# Patient Record
Sex: MALE | Race: WHITE | NOT HISPANIC OR LATINO | Employment: OTHER | URBAN - METROPOLITAN AREA
[De-identification: names, ages, dates, MRNs, and addresses within clinical notes are randomized per-mention and may not be internally consistent; named-entity substitution may affect disease eponyms.]

---

## 2018-01-16 ENCOUNTER — PES CALL (OUTPATIENT)
Dept: ADMINISTRATIVE | Facility: CLINIC | Age: 73
End: 2018-01-16

## 2019-02-20 ENCOUNTER — TELEPHONE (OUTPATIENT)
Dept: FAMILY MEDICINE | Facility: CLINIC | Age: 74
End: 2019-02-20

## 2019-02-20 NOTE — TELEPHONE ENCOUNTER
We have never seen this patient , he has not seen family practice since 2014 and it wasn't dr de la cruz

## 2019-02-20 NOTE — TELEPHONE ENCOUNTER
----- Message from Sana Montoya sent at 2/20/2019 11:46 AM CST -----  Contact: Senait @ [facility]  Type:  Patient Requesting Referral    Who Called:Senait  Does the patient already have the specialty appointment scheduled?: yes  If yes, what is the date of that appointment?:[id number]  Referral to What Specialty: walk in clinic  Reason for Referral: Aching and coughing patient went to walk in clinic  Does the patient want the referral with a specific physician?:no  [facility] the [facility] an [facility] non [facility]  Patient Requesting a Response?: / Senait  Would the patient rather a call back or a response via [facility]?call  Best Call Back Number:[phone]   Additional Information: fax#  [phone], [email or web address]

## 2020-11-16 ENCOUNTER — PES CALL (OUTPATIENT)
Dept: ADMINISTRATIVE | Facility: CLINIC | Age: 75
End: 2020-11-16